# Patient Record
Sex: FEMALE | Race: BLACK OR AFRICAN AMERICAN | NOT HISPANIC OR LATINO | URBAN - METROPOLITAN AREA
[De-identification: names, ages, dates, MRNs, and addresses within clinical notes are randomized per-mention and may not be internally consistent; named-entity substitution may affect disease eponyms.]

---

## 2021-08-10 ENCOUNTER — EMERGENCY (EMERGENCY)
Facility: HOSPITAL | Age: 46
LOS: 1 days | Discharge: HOME | End: 2021-08-10
Admitting: EMERGENCY MEDICINE
Payer: MEDICAID

## 2021-08-10 VITALS
TEMPERATURE: 98 F | DIASTOLIC BLOOD PRESSURE: 92 MMHG | OXYGEN SATURATION: 100 % | RESPIRATION RATE: 16 BRPM | HEART RATE: 74 BPM | HEIGHT: 63 IN | SYSTOLIC BLOOD PRESSURE: 139 MMHG

## 2021-08-10 PROCEDURE — 99283 EMERGENCY DEPT VISIT LOW MDM: CPT

## 2021-08-11 RX ORDER — OXYCODONE AND ACETAMINOPHEN 5; 325 MG/1; MG/1
1 TABLET ORAL ONCE
Refills: 0 | Status: DISCONTINUED | OUTPATIENT
Start: 2021-08-11 | End: 2021-08-11

## 2021-08-11 RX ADMIN — OXYCODONE AND ACETAMINOPHEN 1 TABLET(S): 5; 325 TABLET ORAL at 00:28

## 2021-08-11 NOTE — ED PROVIDER NOTE - PHYSICAL EXAMINATION
+ tooth # 18 extracted, + mild oozing  of blood, teeth in front no swelling, tenderness, no percussion tenderness, no facial swellin//erythema

## 2021-08-11 NOTE — ED PROVIDER NOTE - OBJECTIVE STATEMENT
46 y/o female no pmh, had molar tooth extracted tooth # 18, ~ 6 pm, and shortly thereafter started having pain to the 2 teeethin frint of it, has been taking motrin with minimal relief, denies any HA, enck pain, cough, f/c/n/v/d, c hest pain, sob,abdominal pain, or any other concerning symptoms

## 2021-08-11 NOTE — ED PROVIDER NOTE - CLINICAL SUMMARY MEDICAL DECISION MAKING FREE TEXT BOX
dental pain s/p extraction, no facial swelling, no percussion tenderness, pain meds, f/u with dental

## 2021-08-11 NOTE — ED PROVIDER NOTE - PATIENT PORTAL LINK FT
You can access the FollowMyHealth Patient Portal offered by [facility] by registering at the following website: [email or web address]. By joining [employer]’s FollowMyHealth portal, you will also be able to view your health information using other applications (apps) compatible with our system.

## 2021-12-25 ENCOUNTER — EMERGENCY (EMERGENCY)
Facility: HOSPITAL | Age: 46
LOS: 1 days | Discharge: HOME | End: 2021-12-25
Attending: EMERGENCY MEDICINE | Admitting: EMERGENCY MEDICINE
Payer: MEDICAID

## 2021-12-25 VITALS
OXYGEN SATURATION: 100 % | DIASTOLIC BLOOD PRESSURE: 96 MMHG | HEIGHT: 63 IN | TEMPERATURE: 98 F | RESPIRATION RATE: 18 BRPM | HEART RATE: 72 BPM | SYSTOLIC BLOOD PRESSURE: 149 MMHG

## 2021-12-25 LAB
FLUAV AG NPH QL: SIGNIFICANT CHANGE UP
FLUBV AG NPH QL: SIGNIFICANT CHANGE UP
RSV RNA NPH QL NAA+NON-PROBE: SIGNIFICANT CHANGE UP
SARS-COV-2 RNA SPEC QL NAA+PROBE: DETECTED

## 2021-12-25 PROCEDURE — 93010 ELECTROCARDIOGRAM REPORT: CPT

## 2021-12-25 PROCEDURE — 99284 EMERGENCY DEPT VISIT MOD MDM: CPT | Mod: 25

## 2021-12-25 RX ORDER — IBUPROFEN 200 MG
600 TABLET ORAL ONCE
Refills: 0 | Status: COMPLETED | OUTPATIENT
Start: 2021-12-25 | End: 2021-12-25

## 2021-12-25 RX ORDER — ACETAMINOPHEN 500 MG
975 TABLET ORAL ONCE
Refills: 0 | Status: COMPLETED | OUTPATIENT
Start: 2021-12-25 | End: 2021-12-25

## 2021-12-25 RX ADMIN — Medication 600 MILLIGRAM(S): at 09:58

## 2021-12-25 RX ADMIN — Medication 975 MILLIGRAM(S): at 09:58

## 2021-12-25 NOTE — ED PROVIDER NOTE - CLINICAL SUMMARY MEDICAL DECISION MAKING FREE TEXT BOX
Impression: 45yo F no sig pmhx comes to ED w/ viral like symptoms of cough, chills, body aches, nausea, and vomiting (x1 non bloody). Their symptoms are concerning for viral illness.    Ordered testing and medications for diagnosis, management, and treatment.

## 2021-12-25 NOTE — ED ADULT TRIAGE NOTE - CHIEF COMPLAINT QUOTE
Pt AOX4 c/o body aches, chills, cough x 48 hrs; pt is vaccinated, no known exposure Pt AOX4 c/o body aches, chills, cough x 48 hrs; pt is vaccinated, no known exposure; afebrile orally in triage

## 2021-12-25 NOTE — ED PROVIDER NOTE - NSFOLLOWUPINSTRUCTIONS_ED_ALL_ED_FT
Viral Respiratory Infection    A viral respiratory infection is an illness that affects parts of the body used for breathing, like the lungs, nose, and throat. It is caused by a germ called a virus. Symptoms can include runny nose, coughing, sneezing, fatigue, body aches, sore throat, fever, or headache. Over the counter medicine can be used to manage the symptoms but the infection typically goes away on its own in 5 to 10 days.    You will be contacted by your phone number given [phone] for the COVID results in the next 24-48 hours. If covid positive follow the results below.     SEEK IMMEDIATE MEDICAL CARE IF YOU HAVE ANY OF THE FOLLOWING SYMPTOMS: shortness of breath, chest pain, fever over 10 days, or lightheadedness/dizziness.    If COVID +     COVID is a viral infection is an illness that affects parts of the body used for breathing, like the lungs, nose, throat, as well as the GI tract. It is caused by a germ called a virus. Symptoms can include runny nose, coughing, sneezing, fatigue, body aches, sore throat, fever, headache, nausea, vomiting, diarrhea. Over the counter medicine can be used to manage the symptoms but the infection typically goes away on its own in 10 to 14 days.     Quarantine until the 14th day, symptoms resolve, and you test negative for the virus. Follow up with your primary care doctors if symptoms persist.     SEEK IMMEDIATE MEDICAL CARE IF YOU HAVE ANY OF THE FOLLOWING SYMPTOMS: shortness of breath, chest pain, fever, nausea, vomiting over 10 days, or lightheadedness/dizziness. Viral Respiratory Infection    A viral respiratory infection is an illness that affects parts of the body used for breathing, like the lungs, nose, and throat. It is caused by a germ called a virus. Symptoms can include runny nose, coughing, sneezing, fatigue, body aches, sore throat, fever, or headache. Over the counter medicine can be used to manage the symptoms but the infection typically goes away on its own in 5 to 10 days.    You will be contacted by your phone number given [phone] for the COVID results in the next 24-48 hours. If covid positive follow the instructions below.     SEEK IMMEDIATE MEDICAL CARE IF YOU HAVE ANY OF THE FOLLOWING SYMPTOMS: shortness of breath, chest pain, fever over 10 days, or lightheadedness/dizziness.    If COVID +     COVID is a viral infection is an illness that affects parts of the body used for breathing, like the lungs, nose, throat, as well as the GI tract. It is caused by a germ called a virus. Symptoms can include runny nose, coughing, sneezing, fatigue, body aches, sore throat, fever, headache, nausea, vomiting, diarrhea. Over the counter medicine can be used to manage the symptoms but the infection typically goes away on its own in 10 to 14 days.     Quarantine until the 14th day, symptoms resolve, and you test negative for the virus. Follow up with your primary care doctors if symptoms persist.     SEEK IMMEDIATE MEDICAL CARE IF YOU HAVE ANY OF THE FOLLOWING SYMPTOMS: shortness of breath, chest pain, fever, nausea, vomiting over 10 days, or lightheadedness/dizziness.

## 2021-12-25 NOTE — ED PROVIDER NOTE - PATIENT PORTAL LINK FT
You can access the FollowMyHealth Patient Portal offered by [facility] by registering at the following website: [email or web address]. By joining [employer]’s FollowMyHealth portal, you will also be able to view your health information using other applications (apps) compatible with our system.

## 2021-12-25 NOTE — ED PROVIDER NOTE - PROGRESS NOTE DETAILS
Offered patient lab work and chest xray imaging due to chest pain reproducible with cough. Patient declined wishing to leave the ED as quick as possible to get back with her family. Spoke to patient about results and lack of urgent or emergent pathology at this time. Plan to discharge patient. Patient given follow up and return precautions. Patient and family agree with plan. Patient is stable/improved. Spoke to patient about results and lack of urgent or emergent pathology at this time. Spoke to patient about receiving COVID results in the next 24-48 hours with either their phone number or email address that was given during the interview. Patient educated on quarantining until the 10-14th day, symptoms resolve, and they test negative for the virus. Patient informed to follow up with primary care doctors if symptoms persist. Plan to discharge patient. Patient given follow up and return precautions. Patient agrees with plan.

## 2021-12-25 NOTE — ED PROVIDER NOTE - PHYSICAL EXAMINATION
General: non-toxic, NAD  HEENT: NCAT, PERRL  Cardiac: RRR, no murmurs, 2+ peripheral pulses  Chest: CTAB  Abdomen: soft, non-distended, bowel sounds present, no ttp, no rebound or guarding  Extremities: no peripheral edema, calf tenderness, or leg size discrepancies  Skin: no rashes  Neuro: AAOx4, 5+motor, sensory grossly intact  Psych: mood and affect appropriate

## 2021-12-25 NOTE — ED PROVIDER NOTE - ATTENDING CONTRIBUTION TO CARE
Dr. Sepulveda: 45 yo female with no sig PMH in ED with cough, body aches and N/V for 2 days.  Pt has anterior chest wall pain when coughing but not when she is not coughing.  On exam pt well appearing, in NAD, heart RRR, lungs CTAB, abd NTND, extremities without swelling, strength 5/5 in all extremities and skin without rash.

## 2021-12-25 NOTE — ED PROVIDER NOTE - NS ED ROS FT
Constitutional: chills  HEENT: cough  Cardiac: chest wall pain w/ cough. no palpitations  Respiratory: no SOB  GI: n/v. no abd pain, bloody or dark stools  : no dysuria, frequency, or hematuria  MSK: no joint pain  Skin: no rashes  Neuro: no headache, change in vision, weakness  Psych: negative

## 2021-12-25 NOTE — ED POST DISCHARGE NOTE - RESULT SUMMARY
MOSHE Rogel: Pt called back for COVID test results. Covid positive. Isolation and return precautions discussed.

## 2021-12-25 NOTE — ED PROVIDER NOTE - OBJECTIVE STATEMENT
47yo F no sig pmhx comes to ED w/ viral like symptoms of cough, chills, body aches, nausea, and vomiting (x1 non bloody). Their symptoms are moderate, constant, non mediating. Patient is covid vaccinated second shot 9/25/21.  Started 48 hours ago randomly. Reports chest wall pain when coughing. Denies sick contacts (patient works from home, children are home schooled).

## 2023-05-20 ENCOUNTER — EMERGENCY (EMERGENCY)
Facility: HOSPITAL | Age: 48
LOS: 1 days | Discharge: HOME | End: 2023-05-20
Admitting: STUDENT IN AN ORGANIZED HEALTH CARE EDUCATION/TRAINING PROGRAM
Payer: MEDICAID

## 2023-05-20 VITALS
RESPIRATION RATE: 16 BRPM | OXYGEN SATURATION: 100 % | TEMPERATURE: 99 F | HEART RATE: 74 BPM | DIASTOLIC BLOOD PRESSURE: 79 MMHG | SYSTOLIC BLOOD PRESSURE: 125 MMHG

## 2023-05-20 VITALS
DIASTOLIC BLOOD PRESSURE: 53 MMHG | RESPIRATION RATE: 16 BRPM | OXYGEN SATURATION: 100 % | TEMPERATURE: 98 F | HEIGHT: 63 IN | HEART RATE: 84 BPM | SYSTOLIC BLOOD PRESSURE: 153 MMHG

## 2023-05-20 LAB
ALBUMIN SERPL ELPH-MCNC: 4.2 G/DL — SIGNIFICANT CHANGE UP (ref 3.3–5)
ALP SERPL-CCNC: 59 U/L — SIGNIFICANT CHANGE UP (ref 40–120)
ALT FLD-CCNC: 15 U/L — SIGNIFICANT CHANGE UP (ref 4–33)
ANION GAP SERPL CALC-SCNC: 12 MMOL/L — SIGNIFICANT CHANGE UP (ref 7–14)
APPEARANCE UR: CLEAR — SIGNIFICANT CHANGE UP
AST SERPL-CCNC: 25 U/L — SIGNIFICANT CHANGE UP (ref 4–32)
BACTERIA # UR AUTO: NEGATIVE — SIGNIFICANT CHANGE UP
BASOPHILS # BLD AUTO: 0.04 K/UL — SIGNIFICANT CHANGE UP (ref 0–0.2)
BASOPHILS NFR BLD AUTO: 0.7 % — SIGNIFICANT CHANGE UP (ref 0–2)
BILIRUB SERPL-MCNC: 0.5 MG/DL — SIGNIFICANT CHANGE UP (ref 0.2–1.2)
BILIRUB UR-MCNC: NEGATIVE — SIGNIFICANT CHANGE UP
BUN SERPL-MCNC: 13 MG/DL — SIGNIFICANT CHANGE UP (ref 7–23)
CALCIUM SERPL-MCNC: 9.1 MG/DL — SIGNIFICANT CHANGE UP (ref 8.4–10.5)
CHLORIDE SERPL-SCNC: 109 MMOL/L — HIGH (ref 98–107)
CK SERPL-CCNC: 114 U/L — SIGNIFICANT CHANGE UP (ref 25–170)
CO2 SERPL-SCNC: 20 MMOL/L — LOW (ref 22–31)
COLOR SPEC: YELLOW — SIGNIFICANT CHANGE UP
CREAT SERPL-MCNC: 0.83 MG/DL — SIGNIFICANT CHANGE UP (ref 0.5–1.3)
DIFF PNL FLD: ABNORMAL
EGFR: 87 ML/MIN/1.73M2 — SIGNIFICANT CHANGE UP
EOSINOPHIL # BLD AUTO: 0.07 K/UL — SIGNIFICANT CHANGE UP (ref 0–0.5)
EOSINOPHIL NFR BLD AUTO: 1.1 % — SIGNIFICANT CHANGE UP (ref 0–6)
EPI CELLS # UR: 10 /HPF — HIGH (ref 0–5)
GLUCOSE SERPL-MCNC: 82 MG/DL — SIGNIFICANT CHANGE UP (ref 70–99)
GLUCOSE UR QL: NEGATIVE — SIGNIFICANT CHANGE UP
HCT VFR BLD CALC: 37.8 % — SIGNIFICANT CHANGE UP (ref 34.5–45)
HGB BLD-MCNC: 12.3 G/DL — SIGNIFICANT CHANGE UP (ref 11.5–15.5)
HYALINE CASTS # UR AUTO: 3 /LPF — SIGNIFICANT CHANGE UP (ref 0–7)
IANC: 3.86 K/UL — SIGNIFICANT CHANGE UP (ref 1.8–7.4)
IMM GRANULOCYTES NFR BLD AUTO: 0.3 % — SIGNIFICANT CHANGE UP (ref 0–0.9)
KETONES UR-MCNC: NEGATIVE — SIGNIFICANT CHANGE UP
LEUKOCYTE ESTERASE UR-ACNC: ABNORMAL
LYMPHOCYTES # BLD AUTO: 1.66 K/UL — SIGNIFICANT CHANGE UP (ref 1–3.3)
LYMPHOCYTES # BLD AUTO: 27 % — SIGNIFICANT CHANGE UP (ref 13–44)
MCHC RBC-ENTMCNC: 29.1 PG — SIGNIFICANT CHANGE UP (ref 27–34)
MCHC RBC-ENTMCNC: 32.5 GM/DL — SIGNIFICANT CHANGE UP (ref 32–36)
MCV RBC AUTO: 89.4 FL — SIGNIFICANT CHANGE UP (ref 80–100)
MONOCYTES # BLD AUTO: 0.49 K/UL — SIGNIFICANT CHANGE UP (ref 0–0.9)
MONOCYTES NFR BLD AUTO: 8 % — SIGNIFICANT CHANGE UP (ref 2–14)
NEUTROPHILS # BLD AUTO: 3.86 K/UL — SIGNIFICANT CHANGE UP (ref 1.8–7.4)
NEUTROPHILS NFR BLD AUTO: 62.9 % — SIGNIFICANT CHANGE UP (ref 43–77)
NITRITE UR-MCNC: NEGATIVE — SIGNIFICANT CHANGE UP
NRBC # BLD: 0 /100 WBCS — SIGNIFICANT CHANGE UP (ref 0–0)
NRBC # FLD: 0 K/UL — SIGNIFICANT CHANGE UP (ref 0–0)
PH UR: 6.5 — SIGNIFICANT CHANGE UP (ref 5–8)
PLATELET # BLD AUTO: 246 K/UL — SIGNIFICANT CHANGE UP (ref 150–400)
POTASSIUM SERPL-MCNC: 4.3 MMOL/L — SIGNIFICANT CHANGE UP (ref 3.5–5.3)
POTASSIUM SERPL-SCNC: 4.3 MMOL/L — SIGNIFICANT CHANGE UP (ref 3.5–5.3)
PROT SERPL-MCNC: 6.7 G/DL — SIGNIFICANT CHANGE UP (ref 6–8.3)
PROT UR-MCNC: ABNORMAL
RBC # BLD: 4.23 M/UL — SIGNIFICANT CHANGE UP (ref 3.8–5.2)
RBC # FLD: 14.6 % — HIGH (ref 10.3–14.5)
RBC CASTS # UR COMP ASSIST: 2 /HPF — SIGNIFICANT CHANGE UP (ref 0–4)
SODIUM SERPL-SCNC: 141 MMOL/L — SIGNIFICANT CHANGE UP (ref 135–145)
SP GR SPEC: 1.03 — SIGNIFICANT CHANGE UP (ref 1.01–1.05)
UROBILINOGEN FLD QL: ABNORMAL
WBC # BLD: 6.14 K/UL — SIGNIFICANT CHANGE UP (ref 3.8–10.5)
WBC # FLD AUTO: 6.14 K/UL — SIGNIFICANT CHANGE UP (ref 3.8–10.5)
WBC UR QL: 8 /HPF — HIGH (ref 0–5)

## 2023-05-20 PROCEDURE — 99284 EMERGENCY DEPT VISIT MOD MDM: CPT

## 2023-05-20 PROCEDURE — 93971 EXTREMITY STUDY: CPT | Mod: 26,LT

## 2023-05-20 RX ORDER — KETOROLAC TROMETHAMINE 30 MG/ML
30 SYRINGE (ML) INJECTION ONCE
Refills: 0 | Status: DISCONTINUED | OUTPATIENT
Start: 2023-05-20 | End: 2023-05-20

## 2023-05-20 RX ADMIN — Medication 30 MILLIGRAM(S): at 16:54

## 2023-05-20 NOTE — ED PROVIDER NOTE - NS ED ROS FT
ROS:  GENERAL: No fever, no chills  EYES: no change in vision  HEENT: no trouble swallowing, no trouble speaking  CARDIAC: no chest pain  PULMONARY: no cough, no shortness of breath  GI: no abdominal pain, no nausea, no vomiting, no diarrhea, no constipation  : No dysuria, no frequency, no change in appearance, or odor of urine  SKIN: no rashes  NEURO: no headache, no weakness  MSK: +RLE pain.

## 2023-05-20 NOTE — ED PROVIDER NOTE - NSFOLLOWUPINSTRUCTIONS_ED_ALL_ED_FT
You were evaluated for right thigh pain.  You had an ultrasound done which was normal and did not reveal any blood clot.  He also had blood work done which was within normal limits.  Pain is likely due to a muscle strain.  Please follow-up with your PCP in 2 days.  Avoid any strenuous activity.  Take 3 Advil's every 8 hours as needed for pain.  return to ER for any worsening pain, swelling, chest pain, shortness of breath, palpitations or any other concerning symptoms.

## 2023-05-20 NOTE — ED PROVIDER NOTE - PATIENT PORTAL LINK FT
You can access the FollowMyHealth Patient Portal offered by [facility] by registering at the following website: [email or web address]. By joining [employer]’s FollowMyHealth portal, you will also be able to view your health information using other applications (apps) compatible with our system.

## 2023-05-20 NOTE — ED PROVIDER NOTE - OBJECTIVE STATEMENT
47-year-old female no past medical history presents to ED complaining of right thigh pain x2 weeks.  Patient states thought that she may have pulled a muscle because she runs on the treadmill frequently however the pain has been progressively getting worse.  Patient states has not been able to sleep due to the pain.  Pain has been constant.  Has taken Tylenol without any improvement.  Patient states feels the pain from right groin down to posterior thigh region.  Describes pain as crampy pain.  Patient went to [facility] and was advised to come to Ed to r/o DVT.  Denies any swelling, abdominal pain, N/V/D, CP, SOB, palpitations, fall or trauma, recent travel.

## 2023-05-20 NOTE — ED ADULT NURSE NOTE - OBJECTIVE STATEMENT
Received pt in bed A and Ox 3 in NAD resting comfortably c.o RLQ abd pain denies fever, chills N/V, constipation, abd soft non distended n tender, orders noted and completed.

## 2023-05-20 NOTE — ED PROVIDER NOTE - CLINICAL SUMMARY MEDICAL DECISION MAKING FREE TEXT BOX
47-year-old female no past medical history presents to ED complaining of right thigh pain x2 weeks.  Patient states thought that she may have pulled a muscle because she runs on the treadmill frequently however the pain has been progressively getting worse.  Patient states has not been able to sleep due to the pain.  Pain has been constant.  Has taken Tylenol without any improvement.  Patient states feels the pain from right groin down to posterior thigh region.  Describes pain as crampy pain.  Patient went to [facility] and was advised to come to Ed to r/o DVT.  Denies any swelling, abdominal pain, N/V/D, CP, SOB, palpitations, fall or trauma, recent travel.     +TTP over posterior thigh region.  r/o DVT, muscle vs rhabdomyolysis.  will check labs, doppler RLE, pain control and reassess.

## 2023-05-20 NOTE — ED PROVIDER NOTE - PHYSICAL EXAMINATION
Vital signs reviewed.   CONSTITUTIONAL: Well-appearing; well-nourished; in no apparent distress. Non-toxic appearing.   HEAD: Normocephalic, atraumatic.  EYES: PERRL, EOM intact, conjunctiva and sclera WNL.  ENT: normal nose; no rhinorrhea; normal pharynx with no tonsillar hypertrophy, no erythema, no exudate, no lymphadenopathy.  NECK/LYMPH: Supple; non-tender; no cervical lymphadenopathy.  CARD: Normal S1, S2; no murmurs, rubs, or gallops noted.  RESP: Normal chest excursion with respiration; breath sounds clear and equal bilaterally; no wheezes, rhonchi, or rales.  ABD/GI: soft, non-distended; non-tender; no palpable organomegaly, no pulsatile mass.  EXT/MS: RLE: FROM of right hip and right knee, +TTP over posterior thigh region.  no calf tenderness. moves all extremities; distal pulses are normal, no pedal edema.  SKIN: Normal for age and race; warm; dry; good turgor; no apparent lesions or exudate noted.  NEURO: Awake, alert, oriented x 3, no gross deficits, CN II-XII grossly intact, no motor or sensory deficit noted.  PSYCH: Normal mood; appropriate affect.

## 2024-03-12 ENCOUNTER — EMERGENCY (EMERGENCY)
Facility: HOSPITAL | Age: 49
LOS: 1 days | Discharge: HOME | End: 2024-03-12
Admitting: EMERGENCY MEDICINE
Payer: MEDICAID

## 2024-03-12 VITALS
OXYGEN SATURATION: 98 % | HEART RATE: 81 BPM | TEMPERATURE: 98 F | RESPIRATION RATE: 16 BRPM | SYSTOLIC BLOOD PRESSURE: 147 MMHG | DIASTOLIC BLOOD PRESSURE: 80 MMHG

## 2024-03-12 VITALS
RESPIRATION RATE: 18 BRPM | TEMPERATURE: 98 F | HEART RATE: 71 BPM | DIASTOLIC BLOOD PRESSURE: 83 MMHG | SYSTOLIC BLOOD PRESSURE: 140 MMHG | OXYGEN SATURATION: 99 %

## 2024-03-12 LAB
ALBUMIN SERPL ELPH-MCNC: 4.2 G/DL — SIGNIFICANT CHANGE UP (ref 3.3–5)
ALP SERPL-CCNC: 63 U/L — SIGNIFICANT CHANGE UP (ref 40–120)
ALT FLD-CCNC: 16 U/L — SIGNIFICANT CHANGE UP (ref 4–33)
ANION GAP SERPL CALC-SCNC: 11 MMOL/L — SIGNIFICANT CHANGE UP (ref 7–14)
AST SERPL-CCNC: 24 U/L — SIGNIFICANT CHANGE UP (ref 4–32)
BASOPHILS # BLD AUTO: 0.03 K/UL — SIGNIFICANT CHANGE UP (ref 0–0.2)
BASOPHILS NFR BLD AUTO: 0.5 % — SIGNIFICANT CHANGE UP (ref 0–2)
BILIRUB SERPL-MCNC: 0.2 MG/DL — SIGNIFICANT CHANGE UP (ref 0.2–1.2)
BUN SERPL-MCNC: 16 MG/DL — SIGNIFICANT CHANGE UP (ref 7–23)
CALCIUM SERPL-MCNC: 9.3 MG/DL — SIGNIFICANT CHANGE UP (ref 8.4–10.5)
CHLORIDE SERPL-SCNC: 106 MMOL/L — SIGNIFICANT CHANGE UP (ref 98–107)
CK SERPL-CCNC: 173 U/L — HIGH (ref 25–170)
CO2 SERPL-SCNC: 23 MMOL/L — SIGNIFICANT CHANGE UP (ref 22–31)
CREAT SERPL-MCNC: 1.01 MG/DL — SIGNIFICANT CHANGE UP (ref 0.5–1.3)
EGFR: 69 ML/MIN/1.73M2 — SIGNIFICANT CHANGE UP
EOSINOPHIL # BLD AUTO: 0.08 K/UL — SIGNIFICANT CHANGE UP (ref 0–0.5)
EOSINOPHIL NFR BLD AUTO: 1.3 % — SIGNIFICANT CHANGE UP (ref 0–6)
GLUCOSE SERPL-MCNC: 84 MG/DL — SIGNIFICANT CHANGE UP (ref 70–99)
HCT VFR BLD CALC: 35.3 % — SIGNIFICANT CHANGE UP (ref 34.5–45)
HGB BLD-MCNC: 11.6 G/DL — SIGNIFICANT CHANGE UP (ref 11.5–15.5)
IANC: 2.83 K/UL — SIGNIFICANT CHANGE UP (ref 1.8–7.4)
IMM GRANULOCYTES NFR BLD AUTO: 0.2 % — SIGNIFICANT CHANGE UP (ref 0–0.9)
LYMPHOCYTES # BLD AUTO: 2.36 K/UL — SIGNIFICANT CHANGE UP (ref 1–3.3)
LYMPHOCYTES # BLD AUTO: 39.8 % — SIGNIFICANT CHANGE UP (ref 13–44)
MAGNESIUM SERPL-MCNC: 1.9 MG/DL — SIGNIFICANT CHANGE UP (ref 1.6–2.6)
MCHC RBC-ENTMCNC: 28.9 PG — SIGNIFICANT CHANGE UP (ref 27–34)
MCHC RBC-ENTMCNC: 32.9 GM/DL — SIGNIFICANT CHANGE UP (ref 32–36)
MCV RBC AUTO: 88 FL — SIGNIFICANT CHANGE UP (ref 80–100)
MONOCYTES # BLD AUTO: 0.62 K/UL — SIGNIFICANT CHANGE UP (ref 0–0.9)
MONOCYTES NFR BLD AUTO: 10.5 % — SIGNIFICANT CHANGE UP (ref 2–14)
NEUTROPHILS # BLD AUTO: 2.83 K/UL — SIGNIFICANT CHANGE UP (ref 1.8–7.4)
NEUTROPHILS NFR BLD AUTO: 47.7 % — SIGNIFICANT CHANGE UP (ref 43–77)
NRBC # BLD: 0 /100 WBCS — SIGNIFICANT CHANGE UP (ref 0–0)
NRBC # FLD: 0 K/UL — SIGNIFICANT CHANGE UP (ref 0–0)
PHOSPHATE SERPL-MCNC: 3.9 MG/DL — SIGNIFICANT CHANGE UP (ref 2.5–4.5)
PLATELET # BLD AUTO: 205 K/UL — SIGNIFICANT CHANGE UP (ref 150–400)
POTASSIUM SERPL-MCNC: 3.9 MMOL/L — SIGNIFICANT CHANGE UP (ref 3.5–5.3)
POTASSIUM SERPL-SCNC: 3.9 MMOL/L — SIGNIFICANT CHANGE UP (ref 3.5–5.3)
PROT SERPL-MCNC: 7.2 G/DL — SIGNIFICANT CHANGE UP (ref 6–8.3)
RBC # BLD: 4.01 M/UL — SIGNIFICANT CHANGE UP (ref 3.8–5.2)
RBC # FLD: 14.6 % — HIGH (ref 10.3–14.5)
SODIUM SERPL-SCNC: 140 MMOL/L — SIGNIFICANT CHANGE UP (ref 135–145)
WBC # BLD: 5.93 K/UL — SIGNIFICANT CHANGE UP (ref 3.8–10.5)
WBC # FLD AUTO: 5.93 K/UL — SIGNIFICANT CHANGE UP (ref 3.8–10.5)

## 2024-03-12 PROCEDURE — 93970 EXTREMITY STUDY: CPT | Mod: 26

## 2024-03-12 PROCEDURE — 99284 EMERGENCY DEPT VISIT MOD MDM: CPT

## 2024-03-12 PROCEDURE — 73552 X-RAY EXAM OF FEMUR 2/>: CPT | Mod: 26,RT

## 2024-03-12 RX ORDER — ACETAMINOPHEN 500 MG
650 TABLET ORAL ONCE
Refills: 0 | Status: COMPLETED | OUTPATIENT
Start: 2024-03-12 | End: 2024-03-12

## 2024-03-12 RX ADMIN — Medication 650 MILLIGRAM(S): at 20:28

## 2024-03-12 NOTE — ED ADULT NURSE NOTE - OBJECTIVE STATEMENT
pt received to wellness. A&Ox4, amb at baseline. denies pmh. pt c/ol R hip pain radiating down R leg as well as left ankle pain. states symptoms began x6 weeks ago and worsened over the past few weeks. Denies other complaints. butterflied for labs. sent to ay. safety maintained.

## 2024-03-12 NOTE — ED PROVIDER NOTE - CLINICAL SUMMARY MEDICAL DECISION MAKING FREE TEXT BOX
48-year-old female with no stated past medical history presenting to the ER with 6 months of right lower extremity pain.  Patient states she has pain that typically originates in the right thigh and radiates down into the right foot.  Patient states symptoms have been worsening over the past few days, does report right lower extremity swelling yesterday which has since resolved.  On exam pt is well appearing, afebrile, NV intact, no skin changes to RLE, no obvious swelling. Will r/o DVT, bony pathology to femur given location of pain, will check cbc/cmp/ck, pain control. 48-year-old female with no stated past medical history presenting to the ER with 6 months of right lower extremity pain.  Patient states she has pain that typically originates in the right thigh and radiates down into the right foot.  Patient states symptoms have been worsening over the past few days, does report right lower extremity swelling yesterday which has since resolved.  Does report intermittent mild right sided low back pain. On exam pt is well appearing, afebrile, NV intact, no skin changes to RLE, no obvious swelling. Will r/o DVT, bony pathology to femur given location of pain, symptoms could be related to sciatica, will check cbc/cmp/ck, pain control.

## 2024-03-12 NOTE — ED ADULT TRIAGE NOTE - CHIEF COMPLAINT QUOTE
ambulatory to triage c/o right hip pain that radiates down right leg, also c/o left ankle pain. pt says these symptoms started X 6 months ago worsening over last couple of weeks. pt from work, says "on feet all day while at work". denies swelling, chest pain, SOB. denies any past medical history.

## 2024-03-12 NOTE — ED ADULT NURSE NOTE - NSFALLUNIVINTERV_ED_ALL_ED
Bed/Stretcher in lowest position, wheels locked, appropriate side rails in place/Call bell, personal items and telephone in reach/Instruct patient to call for assistance before getting out of bed/chair/stretcher/Non-slip footwear applied when patient is off stretcher/[place] to call system/Physically safe environment - no spills, clutter or unnecessary equipment/Purposeful proactive rounding/Room/bathroom lighting operational, light cord in reach

## 2024-03-12 NOTE — ED ADULT TRIAGE NOTE - HEART RATE (BEATS/MIN)
Rosalind Esquivel is calling to request a refill on the following medication(s):    Medication Request:  Requested Prescriptions     Pending Prescriptions Disp Refills    losartan (COZAAR) 50 MG tablet [Pharmacy Med Name: Losartan Potassium 50 MG Oral Tablet] 90 tablet 1     Sig: TAKE 1 TABLET BY MOUTH DAILY       Last Visit Date (If Applicable):  6/99/5660    Next Visit Date:    5/15/2023
81

## 2024-03-12 NOTE — ED PROVIDER NOTE - PATIENT PORTAL LINK FT
You can access the FollowMyHealth Patient Portal offered by [facility] by registering at the following website: [email or web address]. By joining [employer]’s FollowMyHealth portal, you will also be able to view your health information using other applications (apps) compatible with our system.

## 2024-03-12 NOTE — ED PROVIDER NOTE - OBJECTIVE STATEMENT
48-year-old female with no stated past medical history presenting to the ER with 6 months of right lower extremity pain.  Patient states she has pain that typically originates in the right thigh and radiates down into the right foot.  Patient states symptoms have been worsening over the past few days, does report right lower extremity swelling yesterday which has since resolved.  Patient states she does at times have very mild right-sided low back pain but it is not always present when she feels pain in the leg.  Patient denies skin changes to the area, numbness/tingling, weakness, difficulty walking, groin pain or swelling, urinary complaints, abdominal pain, N/V/D, bowel or bladder incontinence, saddle anesthesia, CP, SOB, recent travel or illness or any other concerns.

## 2024-03-12 NOTE — ED PROVIDER NOTE - PROGRESS NOTE DETAILS
Labs and imaging reviewed, ultrasound without evidence of DVT, x-ray does show severe joint space narrowing of the right hip, discussed with patient recommend outpatient orthopedics follow-up.  Patient will return to the ER with any worsening or concerning symptoms and she will also follow-up with her primary care doctor. Labs and imaging reviewed, ultrasound without evidence of DVT, x-ray does show severe joint space narrowing of the right hip, discussed with patient recommend outpatient orthopedics follow-up.  Patient will return to the ER with any worsening or concerning symptoms and she will also follow-up with her primary care doctor. Pt requesting discharge

## 2024-03-12 NOTE — ED PROVIDER NOTE - PHYSICAL EXAMINATION
no obvious swelling/edema of RLE, FROM of RLE, no skin changes  no midline spinal tenderness  strength 5/5 in b/l LE, sensation intact and equal b/l

## 2024-03-12 NOTE — ED PROVIDER NOTE - NSFOLLOWUPINSTRUCTIONS_ED_ALL_ED_FT
Follow-up with your primary care doctor within 1 week, show copies of your results  Follow-up with an orthopedic doctor within 1 week, show copies of your x-ray results  Take ibuprofen 600 mg every 6-8 hours as needed for pain, take with food  – You can also take Tylenol 650 mg every 6 hours  Return to the ER with any worsening or concerning symptoms, increased pain, swelling, numbness/tingling, weakness or any other concerns.

## 2024-10-30 NOTE — ED ADULT NURSE NOTE - SUICIDE SCREENING QUESTION 1
Initially presented at the [facility] with left lower extremity cellulitis with open wound  S/p I&D, L 2nd toe amputation with podiatry on 10/21 given concern for acutely worsening wound/infection   Transferred to the [facility] for vascular surgery/IR evaluations given concern also for embolic process/ischemia   Agram 10/25   Status post left foot TMA on 10/28  Continue IV heparin gtt   Appreciate infectious disease input  Continue IV Zosyn until decision made on plan/timing of delayed closure  Wound culture from OR on 10/21 with polymicrobial growth including Bacteroides pyogenes, Finegoldia magna, Actinomyces species, coagulase-negative Staphylococcus, and Globicatella species  Initial plan for wound VAC placement yesterday by podiatry postponed as wound site noted to still be bleeding, stabilized with compression and cautery -> underwent wound VAC placement today with plan for subsequent dressing change on 11/1   No

## 2025-02-05 NOTE — ED PROVIDER NOTE - PRINCIPAL DIAGNOSIS
[facility]                                                                                   OUTPATIENT OCCUPATIONAL THERAPY    PLAN OF TREATMENT FOR OUTPATIENT REHABILITATION   Patient's Last Name, First Name, Zayra Stern YOB: 1971   Provider's Name   [facility]   Medical Record No.  [id number]     Onset Date: (P) 02/04/25 Start of Care Date: 02/05/25     Medical Diagnosis:  s/p L TSA               OT Diagnosis:  (P) Decreased safety/ind w/ ADLs Certification Dates:  From: 02/05/25  To: 02/08/25     See note for plan of treatment, functional goals, and certification details.    I CERTIFY THE NEED FOR THESE SERVICES FURNISHED UNDER        THIS PLAN OF TREATMENT AND WHILE UNDER MY CARE (Physician co-signature of this document indicates review and certification of the therapy plan).                               02/05/25 0800   Appointment Info   Signing Clinician's Name / Credentials (OT) GIGI Parker   Student Supervision Direct supervision provided;Direct Patient Contact Provided   Rehab Comments (OT) NWB L UE   Quick Adds   Quick Adds Certification   Living Environment   People in Home spouse   Current Living Arrangements house   Home Accessibility no concerns   Number of Stairs, Within Home, Primary two   Transportation Anticipated family or friend will provide   Living Environment Comments Lives in rambler w/ spouse. All needs met on one level. WIS, shower chair. Low toilet, has raised toilet seat if needed. Has basement, doesn't need to go down.   Self-Care   Usual Activity Tolerance good   Current Activity Tolerance moderate   Equipment Currently Used at Home none   Fall history within last six months no   Activity/Exercise/Self-Care Comment PLOF Ind all ADLs and most IADLs. Assist w/ IADLs from spouse as needed. No AE used at baseline.   General Information   Onset of Illness/Injury or Date of Surgery 02/04/25  "  Referring Physician Nataliia Dixon MD   Patient/Family Therapy Goal Statement (OT) Did not endorse   Additional Occupational Profile Info/Pertinent History of Current Problem Per chart: \"Zayra Ramirez is a 54 year old female patient with a past medical history significant for ILD, moderate persistent asthma, ROBERT w/ CPAP (O2 3L nocturnal), HTN, allergic rhinitis, former tobacco use, anxiety, depression, PTSD, RLS, neuropathy, borderline personality, morbid obesity, GERD, TMJ syndrome, seronegative rheumatoid arthritis, chronic immunosuppression, chronic pain, pseudogout, anemia, ACDF with plate fixation C3-C5 2017, MSSA epidural abscess 2019 s/p laminectomies T11-L1 and antibiotics (associated with spinal cord stimulator removed 8/13/19), hysterectomy, R shoulder pseudogout, primary osteoarthritis of left shoulder who was admitted to [facility] after undergoing left total shoulder arthroplasty with Dr. Dixon on 2/4/25.\"   Existing Precautions/Restrictions shoulder   Left Upper Extremity (Weight-bearing Status) non weight-bearing (NWB)   Right Upper Extremity (Weight-bearing Status) full weight-bearing (FWB)   Left Lower Extremity (Weight-bearing Status) full weight-bearing (FWB)   Right Lower Extremity (Weight-bearing Status) full weight-bearing (FWB)   Cognitive Status Examination   Orientation Status orientation to person, place and time   Visual Perception   Visual Impairment/Limitations WFL;corrective lenses full-time   Sensory   Sensory Quick Adds sensation intact   Pain Assessment   Patient Currently in Pain Yes, see Vital Sign flowsheet   Posture   Posture protracted shoulders   Range of Motion Comprehensive   Comment, General Range of Motion LE ROM WFL, R UE ROM WFL, L UE ROM deficits identified   Strength Comprehensive (MMT)   Comment, General Manual Muscle Testing (MMT) Assessment WFL   Muscle Tone Assessment   Muscle Tone Comments WFL   Coordination   Upper Extremity Coordination Left UE impaired "   Functional Limitations Decreased speed;Fine motor ADL performance impaired;Impaired ability to perform bilateral tasks;Object transport impaired;Reach to targets impaired   Bed Mobility   Bed Mobility supine-sit   Supine-Sit Nelson (Bed Mobility) supervision   Transfers   Transfers sit-stand transfer   Sit-Stand Transfer   Sit-Stand Nelson (Transfers) supervision   Balance   Balance Assessment no deficits identified   Activities of Daily Living   BADL Assessment/Intervention bathing;upper body dressing;lower body dressing;grooming;toileting   Bathing Assessment/Intervention   Nelson Level (Bathing) minimum assist (75% patient effort)   Upper Body Dressing Assessment/Training   Nelson Level (Upper Body Dressing) moderate assist (50% patient effort)   Lower Body Dressing Assessment/Training   Nelson Level (Lower Body Dressing) supervision   Grooming Assessment/Training   Nelson Level (Grooming) supervision   Toileting   Nelson Level (Toileting) supervision   Clinical Impression   Criteria for Skilled Therapeutic Interventions Met (OT) Yes, treatment indicated   OT Diagnosis Decreased safety/ind w/ ADLs   OT Problem List-Impairments impacting ADL problems related to;pain;post-surgical precautions;range of motion (ROM)   Assessment of Occupational Performance 1-3 Performance Deficits   Identified Performance Deficits Dressing, bathing, toileting   Planned Therapy Interventions (OT) ADL retraining;orthotic fitting/training;home program guidelines;ROM   Clinical Decision Making Complexity (OT) problem focused assessment/low complexity   Risk & Benefits of therapy have been explained evaluation/treatment results reviewed;care plan/treatment goals reviewed;risks/benefits reviewed;current/potential barriers reviewed;participants voiced agreement with care plan;participants included;patient   Clinical Impression Comments Decreased ability to safely/independently complete ADLs d/t  pain and precautions. Skilled OT necessary to increase safe, functional mobility.   OT Total Evaluation Time   OT Eval, Low Complexity Minutes (04934) 8   Therapy Certification   Medical Diagnosis s/p L TSA   Start of Care Date 02/05/25   Certification date from 02/05/25   Certification date to 02/08/25   OT Goals   Therapy Frequency (OT) Daily   OT Predicted Duration/Target Date for Goal Attainment 02/07/25   OT Goals Hygiene/Grooming;Upper Body Dressing;Lower Body Dressing;Upper Body Bathing;Lower Body Bathing;Toilet Transfer/Toileting;OT Goal 1   OT: Hygiene/Grooming independent;within precautions;while standing   OT: Upper Body Dressing Minimal assist;within precautions;Goal Met;including orthotic   OT: Lower Body Dressing within precautions;Goal Met;Independent   OT: Upper Body Bathing Independent;within precautions   OT: Lower Body Bathing Independent;with precautions   OT: Toilet Transfer/Toileting Independent;toilet transfer;cleaning and garment management;within precautions   OT: Goal 1 Pt will demonstrate understanding of home exercise program for wrist/elbow   Interventions   Interventions Quick Adds Self-Care/Home Management;Therapeutic Procedures/Exercise   Self-Care/Home Management   Self-Care/Home Mgmt/ADL, Compensatory, Meal Prep Minutes (36090) 25   Symptoms Noted During/After Treatment (Meal Preparation/Planning Training) fatigue;increased pain   Treatment Detail/Skilled Intervention After eval, treatment initiated. Pt agreeable to therapy. Session focused on increasing activity tolerance, precaution adherence, and orthotic management as needed to support safety/ind w/ ADLs. Pt educated on ROM and NWB precautions, pt verbalized understanding. Sitting EOB, pt educated on how to don/doff shoulder immobilizer. Pt doffed/donned sling w/ mod VC and min physical A for orthotic management. Pt verbalized understanding, provided w/ handout. Pt educated on UE dressing techniques (shirt and bra) w/in  precautions. Pt attempted to don bra w/ mod A, decided she did not want to wear. Reports she will have  assist at home. Donned shirt w/ min A for clothing management, VC for technique and precaution adherence. Donned underwear and pants SBA. STS from EOB SBA, pulled up garments w/ majority SBA, min A to make final adjustments to clothing for comfortability. Ambulated to BR SBA. Stand to sit on toilet SBA, used grab bar. Pt reports no concerns w/ estela cares. STS from toilet SBA. Ambulated back to room SBA. See below for OOR ambulation and exercises. At end of session, sitting EOB pt educated on bathing techniques w/in precautions, pt verbalized understanding.   Therapeutic Procedures/Exercise   Therapeutic Procedure: strength, endurance, ROM, flexibillity minutes (92834) 10   Symptoms Noted During/After Treatment fatigue;increased pain   Treatment Detail/Skilled Intervention Session focused on increasing activity tolerance as needed for safe/functional mobility and home exercise program adherence. Pt sitting EOB, sling off. Educated on wrist/elbow exercises and importance, visual cues/th demo provided. Pt demoed exercises and verbalized understanding. Following BR ADLs, pt OOB in room. Ambulated OOR and in renee SBA (~100 ft), mobility WFL, no DME required. Upon return to room, stand to sit at EOB SBA. Transferred from EOB to supine SBA. Th facilitated comfortable supine position. Discussed stair completion w/ pt, pt reports having 2 stairs w/ no railing. Th offered practice w/ stair completion, pt denied. Exited w/ pt in bed, call light and all other needs in reach.   OT Discharge Planning   OT Plan Exercises, don/doff immobilizer, upper body dressing, G/H   OT Discharge Recommendation (DC Rec) home;home with assist   OT Rationale for DC Rec Pt below baseline. Ind PLOF. Limited by pain and precautions. Moving well, good precaution adherence. Home set up well for needs. Assist from spouse as needed. Pending  medical readiness, d/c home recommended at this time.   OT Brief overview of current status Ax1 SBA   OT Total Distance Amb During Session (feet) 120   Total Session Time   Timed Code Treatment Minutes 35   Total Session Time (sum of timed and untimed services) 43      Pain of right lower leg

## 2025-03-04 NOTE — ED ADULT TRIAGE NOTE - NS ED NURSE BANDS TYPE
Retinal detachment, left eye  Retinal hole or tear, left eye    - Chronic shallow RD superiorly with few atrophic holes.    - s/p laser retinopexy OS on 11/30/2018.    - SRF within the barrier laser has resolved;     - CR scar inferiorly at 6 oclock     Plan    - Stable, observe    - Would like MRx; consult with optometry     - Monocular precautions    - Discussed f/u annually but patient would like 6 month     mild NPDR OS   - No DME   - A1c is 7.6 (5/24)    OCT 03/04/25     - Right eye (OD): -    - Left eye (OS): Normal foveal contour, RPE  outer and inner retinal layers. 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        
Name band;

## 2025-03-17 ENCOUNTER — EMERGENCY (EMERGENCY)
Facility: HOSPITAL | Age: 50
LOS: 1 days | Discharge: HOME | End: 2025-03-17
Admitting: EMERGENCY MEDICINE
Payer: MEDICAID

## 2025-03-17 VITALS
RESPIRATION RATE: 18 BRPM | TEMPERATURE: 98 F | HEART RATE: 85 BPM | OXYGEN SATURATION: 99 % | WEIGHT: 128.09 LBS | DIASTOLIC BLOOD PRESSURE: 87 MMHG | SYSTOLIC BLOOD PRESSURE: 160 MMHG

## 2025-03-17 VITALS
RESPIRATION RATE: 17 BRPM | TEMPERATURE: 98 F | OXYGEN SATURATION: 100 % | DIASTOLIC BLOOD PRESSURE: 91 MMHG | HEART RATE: 81 BPM | SYSTOLIC BLOOD PRESSURE: 162 MMHG

## 2025-03-17 LAB
ALBUMIN SERPL ELPH-MCNC: 4.1 G/DL — SIGNIFICANT CHANGE UP (ref 3.3–5)
ALP SERPL-CCNC: 56 U/L — SIGNIFICANT CHANGE UP (ref 40–120)
ALT FLD-CCNC: 18 U/L — SIGNIFICANT CHANGE UP (ref 4–33)
ANION GAP SERPL CALC-SCNC: 14 MMOL/L — SIGNIFICANT CHANGE UP (ref 7–14)
AST SERPL-CCNC: 28 U/L — SIGNIFICANT CHANGE UP (ref 4–32)
BASOPHILS # BLD AUTO: 0.03 K/UL — SIGNIFICANT CHANGE UP (ref 0–0.2)
BASOPHILS NFR BLD AUTO: 0.5 % — SIGNIFICANT CHANGE UP (ref 0–2)
BILIRUB SERPL-MCNC: 0.4 MG/DL — SIGNIFICANT CHANGE UP (ref 0.2–1.2)
BUN SERPL-MCNC: 16 MG/DL — SIGNIFICANT CHANGE UP (ref 7–23)
CALCIUM SERPL-MCNC: 8.9 MG/DL — SIGNIFICANT CHANGE UP (ref 8.4–10.5)
CHLORIDE SERPL-SCNC: 107 MMOL/L — SIGNIFICANT CHANGE UP (ref 98–107)
CO2 SERPL-SCNC: 18 MMOL/L — LOW (ref 22–31)
CREAT SERPL-MCNC: 0.71 MG/DL — SIGNIFICANT CHANGE UP (ref 0.5–1.3)
D DIMER BLD IA.RAPID-MCNC: 151 NG/ML DDU — SIGNIFICANT CHANGE UP
EGFR: 104 ML/MIN/1.73M2 — SIGNIFICANT CHANGE UP
EGFR: 104 ML/MIN/1.73M2 — SIGNIFICANT CHANGE UP
EOSINOPHIL # BLD AUTO: 0.09 K/UL — SIGNIFICANT CHANGE UP (ref 0–0.5)
EOSINOPHIL NFR BLD AUTO: 1.4 % — SIGNIFICANT CHANGE UP (ref 0–6)
GLUCOSE SERPL-MCNC: 67 MG/DL — LOW (ref 70–99)
HCT VFR BLD CALC: 33.8 % — LOW (ref 34.5–45)
HGB BLD-MCNC: 11.1 G/DL — LOW (ref 11.5–15.5)
IANC: 3.62 K/UL — SIGNIFICANT CHANGE UP (ref 1.8–7.4)
IMM GRANULOCYTES NFR BLD AUTO: 0.3 % — SIGNIFICANT CHANGE UP (ref 0–0.9)
LYMPHOCYTES # BLD AUTO: 2.24 K/UL — SIGNIFICANT CHANGE UP (ref 1–3.3)
LYMPHOCYTES # BLD AUTO: 34.3 % — SIGNIFICANT CHANGE UP (ref 13–44)
MAGNESIUM SERPL-MCNC: 1.8 MG/DL — SIGNIFICANT CHANGE UP (ref 1.6–2.6)
MCHC RBC-ENTMCNC: 28.9 PG — SIGNIFICANT CHANGE UP (ref 27–34)
MCHC RBC-ENTMCNC: 32.8 G/DL — SIGNIFICANT CHANGE UP (ref 32–36)
MCV RBC AUTO: 88 FL — SIGNIFICANT CHANGE UP (ref 80–100)
MONOCYTES # BLD AUTO: 0.53 K/UL — SIGNIFICANT CHANGE UP (ref 0–0.9)
MONOCYTES NFR BLD AUTO: 8.1 % — SIGNIFICANT CHANGE UP (ref 2–14)
NEUTROPHILS # BLD AUTO: 3.62 K/UL — SIGNIFICANT CHANGE UP (ref 1.8–7.4)
NEUTROPHILS NFR BLD AUTO: 55.4 % — SIGNIFICANT CHANGE UP (ref 43–77)
NRBC # BLD AUTO: 0 K/UL — SIGNIFICANT CHANGE UP (ref 0–0)
NRBC # FLD: 0 K/UL — SIGNIFICANT CHANGE UP (ref 0–0)
NRBC BLD AUTO-RTO: 0 /100 WBCS — SIGNIFICANT CHANGE UP (ref 0–0)
PHOSPHATE SERPL-MCNC: 3 MG/DL — SIGNIFICANT CHANGE UP (ref 2.5–4.5)
PLATELET # BLD AUTO: 219 K/UL — SIGNIFICANT CHANGE UP (ref 150–400)
POTASSIUM SERPL-MCNC: 3.8 MMOL/L — SIGNIFICANT CHANGE UP (ref 3.5–5.3)
POTASSIUM SERPL-SCNC: 3.8 MMOL/L — SIGNIFICANT CHANGE UP (ref 3.5–5.3)
PROT SERPL-MCNC: 6.7 G/DL — SIGNIFICANT CHANGE UP (ref 6–8.3)
RBC # BLD: 3.84 M/UL — SIGNIFICANT CHANGE UP (ref 3.8–5.2)
RBC # FLD: 14 % — SIGNIFICANT CHANGE UP (ref 10.3–14.5)
SODIUM SERPL-SCNC: 139 MMOL/L — SIGNIFICANT CHANGE UP (ref 135–145)
WBC # BLD: 6.53 K/UL — SIGNIFICANT CHANGE UP (ref 3.8–10.5)
WBC # FLD AUTO: 6.53 K/UL — SIGNIFICANT CHANGE UP (ref 3.8–10.5)

## 2025-03-17 PROCEDURE — 93971 EXTREMITY STUDY: CPT | Mod: 26,LT

## 2025-03-17 PROCEDURE — 99284 EMERGENCY DEPT VISIT MOD MDM: CPT

## 2025-03-17 RX ORDER — KETOROLAC TROMETHAMINE 30 MG/ML
15 INJECTION, SOLUTION INTRAMUSCULAR; INTRAVENOUS ONCE
Refills: 0 | Status: DISCONTINUED | OUTPATIENT
Start: 2025-03-17 | End: 2025-03-17

## 2025-03-17 RX ORDER — TRAMADOL HYDROCHLORIDE 50 MG/1
25 TABLET, FILM COATED ORAL ONCE
Refills: 0 | Status: DISCONTINUED | OUTPATIENT
Start: 2025-03-17 | End: 2025-03-17

## 2025-03-17 RX ADMIN — KETOROLAC TROMETHAMINE 15 MILLIGRAM(S): 30 INJECTION, SOLUTION INTRAMUSCULAR; INTRAVENOUS at 19:16

## 2025-03-17 RX ADMIN — KETOROLAC TROMETHAMINE 15 MILLIGRAM(S): 30 INJECTION, SOLUTION INTRAMUSCULAR; INTRAVENOUS at 20:16

## 2025-03-17 NOTE — ED PROVIDER NOTE - NSFOLLOWUPINSTRUCTIONS_ED_ALL_ED_FT
Follow up with your Primary Medical Doctor within 2-3days. If results or reports were given to you, show copies of your reports given to you.     Take all of your medications as previously prescribed.     Follow up with Neurology.   Follow up for ABIs.

## 2025-03-17 NOTE — ED PROVIDER NOTE - PHYSICAL EXAMINATION
Well appearing, well nourished, awake, alert, oriented to person, place, time/situation and in no apparent distress.    Airway patent    Eyes without scleral injection. No jaundice.    Strong pulse.     Respirations unlabored.    Abdomen soft, non-tender, no guarding.    Spine appears normal, range of motion is not limited, 5/5 strength, sensation intact.     Alert and oriented, no gross motor or sensory deficits.    Skin normal color for race, warm, dry and intact. No evidence of rash. Well appearing, well nourished, awake, alert, oriented to person, place, time/situation and in no apparent distress.    Airway patent    Eyes without scleral injection. No jaundice.    Strong pulse.     Respirations unlabored.    Spine appears normal, range of motion is not limited, 5/5 strength, sensation intact.     Alert and oriented, no gross motor or sensory deficits.    Skin normal color for race, warm, dry and intact. No evidence of rash.

## 2025-03-17 NOTE — ED ADULT NURSE NOTE - NSFALLUNIVINTERV_ED_ALL_ED
Bed/Stretcher in lowest position, wheels locked, appropriate side rails in place/Call bell, personal items and telephone in reach/Instruct patient to call for assistance before getting out of bed/chair/stretcher/Non-slip footwear applied when patient is off stretcher/[place] to call system/Physically safe environment - no spills, clutter or unnecessary equipment/Purposeful proactive rounding/Room/bathroom lighting operational, light cord in reach

## 2025-03-17 NOTE — ED PROVIDER NOTE - CLINICAL SUMMARY MEDICAL DECISION MAKING FREE TEXT BOX
39-year-old female with medical history of anemia presents to the ER with complaint of 6 to 7 months of right lower extremity pain, she explains the pain originates in her groin and then travels down the anterior leg to her foot, describes pain as burning in nature.  Today at work patient reported worsening of her pain and was advised by the physician that she works with to come to the ER for a duplex of her lower extremity.  Patient had recent MRI as outpatient approximately 1 month ago that was unremarkable only noting arthritic changes.  Patient states that she is supposed to follow-up for ABIs however has not done so yet, has had physical therapy as well as cortisone injections without relief of her pain.  Reports intermittent swelling in the right lower extremity.  Denies fevers, chills, back pain, abdominal pain, nausea, vomiting, diarrhea, vaginal symptoms, saddle anesthesia, paresthesias, incontinence, chest pain, shortness of breath.  Patient prescribed naproxen, Mobic, tramadol for pain however has only taken naproxen and tramadol last taken yesterday patient labs grossly within normal limits no electrolyte abnormalities patient requested D-dimer be performed resulted as -151.  Duplex negative for DVT.  Patient to be advised to follow-up with neurologist.  Testing instructed to follow-up for ABIs.  pain controlled. dc home

## 2025-03-17 NOTE — ED ADULT NURSE NOTE - OBJECTIVE STATEMENT
pt received to wellness room 2, A&Ox4, ambulatory, denies past medical hx, coming to ED c/o right leg pain x. Worsening with swelling and burning sensation the past few days. Sent from MD to r/o DVT. Pt denies pain/ injury to leg, SOB, chest pain, N/V, abdominal pain, fever, chills, headache, dizziness, or blurry vision. RR even and unlabored on RA. No neuro deficits noted. skin clean dry and intact, 20 G IV placed to left AC. labs drawn and sent. medicated as ordered. pending lab results and imaging. care plan continued, comfort measures provided. safety maintained.

## 2025-03-17 NOTE — ED ADULT NURSE REASSESSMENT NOTE - NS ED NURSE REASSESS COMMENT FT1
Received report from BROOKLYN Low. pt stable resting in bed in non acute distress. respirations even and unlabored. pt offers no complaints at this time. pt awaiting lab results

## 2025-03-17 NOTE — ED PROVIDER NOTE - PATIENT PORTAL LINK FT
You can access the FollowMyHealth Patient Portal offered by [facility] by registering at the following website: [email or web address]. By joining [employer]’s FollowMyHealth portal, you will also be able to view your health information using other applications (apps) compatible with our system.

## 2025-03-17 NOTE — ED ADULT TRIAGE NOTE - CHIEF COMPLAINT QUOTE
Pt. c/o right leg pain x 6 months. Worsening with swelling and burning sensation the past few days. Sent from MD to r/o DVT. No relief with cortisone or tramadol shots. MRI was negative.

## 2025-05-16 ENCOUNTER — RESULT REVIEW (OUTPATIENT)
Age: 50
End: 2025-05-16

## 2025-05-16 ENCOUNTER — EMERGENCY (EMERGENCY)
Facility: HOSPITAL | Age: 50
LOS: 1 days | End: 2025-05-16
Attending: EMERGENCY MEDICINE | Admitting: EMERGENCY MEDICINE
Payer: MEDICAID

## 2025-05-16 VITALS
HEART RATE: 69 BPM | OXYGEN SATURATION: 99 % | DIASTOLIC BLOOD PRESSURE: 80 MMHG | RESPIRATION RATE: 18 BRPM | TEMPERATURE: 98 F | SYSTOLIC BLOOD PRESSURE: 133 MMHG

## 2025-05-16 VITALS
WEIGHT: 126.99 LBS | HEIGHT: 62 IN | SYSTOLIC BLOOD PRESSURE: 145 MMHG | DIASTOLIC BLOOD PRESSURE: 91 MMHG | RESPIRATION RATE: 16 BRPM | HEART RATE: 84 BPM | TEMPERATURE: 98 F | OXYGEN SATURATION: 100 %

## 2025-05-16 PROCEDURE — 99284 EMERGENCY DEPT VISIT MOD MDM: CPT

## 2025-05-16 PROCEDURE — 93971 EXTREMITY STUDY: CPT | Mod: 26

## 2025-05-16 RX ORDER — IBUPROFEN 200 MG
600 TABLET ORAL ONCE
Refills: 0 | Status: COMPLETED | OUTPATIENT
Start: 2025-05-16 | End: 2025-05-16

## 2025-05-16 RX ORDER — ACETAMINOPHEN 500 MG/5ML
975 LIQUID (ML) ORAL ONCE
Refills: 0 | Status: COMPLETED | OUTPATIENT
Start: 2025-05-16 | End: 2025-05-16

## 2025-05-16 RX ADMIN — Medication 600 MILLIGRAM(S): at 12:06

## 2025-05-16 RX ADMIN — Medication 975 MILLIGRAM(S): at 12:06

## 2025-05-16 NOTE — ED ADULT NURSE NOTE - NSFALLUNIVINTERV_ED_ALL_ED
Bed/Stretcher in lowest position, wheels locked, appropriate side rails in place/Call bell, personal items and telephone in reach/Instruct patient to call for assistance before getting out of bed/chair/stretcher/Non-slip footwear applied when patient is off stretcher/[place] to call system/Physically safe environment - no spills, clutter or unnecessary equipment/Purposeful proactive rounding/Room/bathroom lighting operational, light cord in reach

## 2025-05-16 NOTE — ED PROVIDER NOTE - ATTENDING CONTRIBUTION TO CARE
Agree with resident note  49-year-old female with no significant past medical history presents with right groin pain for approximately 8 months.  Patient states Avid runner runs every day.  Does not remember trauma to the area.  Does not remember tripping or falling.  States pain goes from the inner groin towards the knee.  Patient has had extensive workup with ultrasounds, physical therapy, pain management with no clear diagnosis.  Patient denies any weakness or numbness.  States just painful.  Physical exam  Gen: pt well appearing in no respiratory distress  vital signs stable  NCAT  Lungs: CTAB/L  Cardiac: s1 s2 no m/r/g  abdomen: soft/NT/ND  ext: no edema, no swelling in groin area, no CVA tenderness  Neuro: CNs intact 5/5 motor UE and LE; sensation intact; gait stable  skin: no rash  Impression  Likely groin strain will proceed with ultrasound to rule out DVT and have patient follow-up with sports medicine

## 2025-05-16 NOTE — ED PROVIDER NOTE - OBJECTIVE STATEMENT
49-year-old no medical history here for chief complaint of right lower extremity pain for 8 months.  Patient states that she is an avid runner and runs 1 hour 20 minutes on the treadmill every morning since she was 17.  Patient started to have an onset of right inner groin pain radiating down to her knee which makes it difficult for her to ambulate and work.  Patient has had extensive workup with pain management, neurology, physical therapy which has not helped her symptoms.  Patient denying any numbness or tingling to the extremities, weakness

## 2025-05-16 NOTE — ED PROVIDER NOTE - PHYSICAL EXAMINATION
General: well appearing, interactive, well nourished, no apparent distress, ncat  HEENT: EOMI, PERRLA, normal mucosa, normal oropharynx, no lesions on the lips or on oral mucosa, normal external ear  Neck: supple, no lymphadenopathy, full range of motion, no nuchal rigidity  CV: RRR, normal S1 and S2 with no murmur, capillary refill less than two seconds  Resp: lungs CTA b/l, good aeration bilaterally, symmetric chest wall   Abd: non-distended, soft, non-tender  : no CVA tenderness  MSK: full range of motion, no cyanosis, no edema, no clubbing, no immobility  Neuro: CN II-XII grossly intact, muscle strength 5/5 in all extremities, limp gait  Skin: no rashes, skin intact

## 2025-05-16 NOTE — ED PROVIDER NOTE - NSFOLLOWUPINSTRUCTIONS_ED_ALL_ED_FT
- You were seen in the emergency department today for tendonitis.    - Lab and imaging results, if performed, were discussed with you along with your discharge diagnosis.    - Follow up with sports doctor within the next week.    - Return to the ED for any new, worsening, or concerning symptoms to you.    - Continue all prescribed medications.    - Take ibuprofen/tylenol as directed as needed for pain.     - Rest and keep yourself hydrated with fluids.

## 2025-05-16 NOTE — ED ADULT NURSE REASSESSMENT NOTE - NS ED NURSE REASSESS COMMENT FT1
Pt received in results waiting from vascular. Patient awake and resting in stretcher; respirations even and unlabored, no signs/symptoms of acute distress. Patient denies pain and offers no complaints at this time. Safety measures in place; pt pending results of vascular studies.

## 2025-05-16 NOTE — ED ADULT TRIAGE NOTE - CHIEF COMPLAINT QUOTE
Pt ambulatory in triage, c/o right groin and outer thigh pain/burning sensation radiating down leg x 8 months, worsening today while at work. Reports having negative MRI scans and no relief from cortisone shots and Naproxen. Denies falls, injury trauma, numbness/tingling. Denies PHx.

## 2025-05-16 NOTE — ED PROVIDER NOTE - CLINICAL SUMMARY MEDICAL DECISION MAKING FREE TEXT BOX
49-year-old no medical history here for chief complaint of right lower extremity pain for 8 months. Physical exam significant for pain upon ambulation however full range of motion. Tendon/ligament injury suspected however will rule out DVT with right lower extremity duplex scan.

## 2025-05-16 NOTE — ED ADULT NURSE NOTE - OBJECTIVE STATEMENT
Pt received to intake room 8 , A&Ox4, ambulatory,  coming to the ED for c/o atraumatic right hip pain that radiates down the right leg x 8 months worsening over time. Endorsing burning sensation. Pt denies other complaints. RR equal and unlabored on room air. medicated as ordered. Care plan continued. Comfort measures provided. Safety maintained. awaiting imaging.

## 2025-05-16 NOTE — ED PROVIDER NOTE - PATIENT PORTAL LINK FT
You can access the FollowMyHealth Patient Portal offered by [facility] by registering at the following website: [email or web address]. By joining [employer]’s FollowMyHealth portal, you will also be able to view your health information using other applications (apps) compatible with our system.

## 2025-06-10 ENCOUNTER — APPOINTMENT (OUTPATIENT)
Dept: SPORTS MEDICINE | Facility: CLINIC | Age: 50
End: 2025-06-10

## 2025-07-15 ENCOUNTER — EMERGENCY (EMERGENCY)
Facility: HOSPITAL | Age: 50
LOS: 1 days | End: 2025-07-15
Admitting: EMERGENCY MEDICINE
Payer: MEDICAID

## 2025-07-15 VITALS
HEIGHT: 62 IN | TEMPERATURE: 98 F | RESPIRATION RATE: 18 BRPM | DIASTOLIC BLOOD PRESSURE: 88 MMHG | SYSTOLIC BLOOD PRESSURE: 151 MMHG | OXYGEN SATURATION: 99 % | HEART RATE: 89 BPM | WEIGHT: 130.07 LBS

## 2025-07-15 PROCEDURE — 99284 EMERGENCY DEPT VISIT MOD MDM: CPT

## 2025-07-15 RX ORDER — OXYCODONE HYDROCHLORIDE 30 MG/1
1 TABLET ORAL
Qty: 3 | Refills: 0
Start: 2025-07-15

## 2025-07-15 NOTE — ED ADULT NURSE NOTE - NSFALLUNIVINTERV_ED_ALL_ED
Bed/Stretcher in lowest position, wheels locked, appropriate side rails in place/Call bell, personal items and telephone in reach/Instruct patient to call for assistance before getting out of bed/chair/stretcher/Non-slip footwear applied when patient is off stretcher/[place] to call system/Physically safe environment - no spills, clutter or unnecessary equipment/Purposeful proactive rounding/Room/bathroom lighting operational, light cord in reach

## 2025-07-15 NOTE — ED PROVIDER NOTE - PATIENT PORTAL LINK FT
You can access the FollowMyHealth Patient Portal offered by [facility] by registering at the following website: [email or web address]. By joining [employer]’s FollowMyHealth portal, you will also be able to view your health information using other applications (apps) compatible with our system.

## 2025-07-15 NOTE — ED ADULT NURSE NOTE - OBJECTIVE STATEMENT
Pt received to wellness room 5. Pt A&O x 3, ambulatory. Pt c/o right upper dental pain since yesterday Pt states a week ago a tooth cracked and fell out with no pain. Pt endorses pain started yesterday after eating chicken. Minor swelling noted to right side of face, no bleeding noted. Pt denies fever, dizziness, headache, vision changes, chest pain, SOB, N&V, or urinary symptoms. Respirations even and unlabored. +2 pulses in all extremities. Safety maintained. Pending PA evaluation.

## 2025-07-15 NOTE — ED PROVIDER NOTE - CLINICAL SUMMARY MEDICAL DECISION MAKING FREE TEXT BOX
48 yo F w/ no significant PMH, presents to ED c/o right upper tooth pain after tooth cracked last week. well appearing female. afebrile. Impression: fractured tooth down to gumline, will require oral surgery referral for extraction. no signs of abscess. Pt has no pain after taken Naproxen earlier. Plan: oral surgery clinic referral.

## 2025-07-15 NOTE — ED PROVIDER NOTE - NSFOLLOWUPINSTRUCTIONS_ED_ALL_ED_FT
Rest, drink plenty of fluids.  Advance activity as tolerated.  Continue all previously prescribed medications as directed.  Follow up with your primary care physician in 48-72 hours- bring copies of your results.  Return to the ER for worsening or persistent symptoms, and/or ANY NEW OR CONCERNING SYMPTOMS. If you have issues obtaining follow up, please call: [phone] to obtain a doctor or specialist who takes your insurance in your area.     Take Tylenol 650mg (Two 325 mg pills) every 4-6 hours as needed for pain.   Take Naproxen every 12 hours as needed for pain, take with food.   Take Oxycodone 1 tablet every 8 hrs as needed for breakthrough discomfort- caution drowsiness while taking this medication- do not drive or operate heavy machinery.     Our Discharge Lounge will contact you for appointment with Dental clinic.

## 2025-07-15 NOTE — ED PROVIDER NOTE - OBJECTIVE STATEMENT
50 yo F w/ no significant PMH, presents to ED c/o right upper tooth pain after tooth cracked last week. Reports cracked tooth while eating, seen by dentist last Monday, given referral. Admits taking Naproxen with relief, currently no pain. Denies any fever, chills, or any other complaints.

## 2025-07-15 NOTE — ED PROVIDER NOTE - PROGRESS NOTE DETAILS
MOSHE VILLA: spoke with paige Kelsey for oral surgery referral. Pt states she's not pregnant, declined pregnancy test. The patient was given verbal and written discharge instructions. Specifically, instructions when to return to the ED and when to seek follow-up from their pcp was discussed. Any specialty follow-up was discussed, including how to make an appointment.  Instructions were discussed in simple, plain language and was understood by the patient. The patient understands that should their symptoms worsen or any new symptoms arise, they should return to the ED immediately for further evaluation. All pt's questions were answered. Patient verbalizes understanding.

## 2025-07-15 NOTE — ED PROVIDER NOTE - TOOTH FINDINGS
right upper tooth #4:fractured tooth down to gumline, no gum swelling, no discharge, no facial swelling.